# Patient Record
Sex: FEMALE | Race: WHITE | NOT HISPANIC OR LATINO | Employment: FULL TIME | URBAN - METROPOLITAN AREA
[De-identification: names, ages, dates, MRNs, and addresses within clinical notes are randomized per-mention and may not be internally consistent; named-entity substitution may affect disease eponyms.]

---

## 2022-03-01 ENCOUNTER — HOSPITAL ENCOUNTER (EMERGENCY)
Facility: MEDICAL CENTER | Age: 37
End: 2022-03-01
Attending: EMERGENCY MEDICINE
Payer: COMMERCIAL

## 2022-03-01 VITALS
SYSTOLIC BLOOD PRESSURE: 137 MMHG | RESPIRATION RATE: 20 BRPM | TEMPERATURE: 98.7 F | HEART RATE: 96 BPM | WEIGHT: 145.5 LBS | OXYGEN SATURATION: 96 % | DIASTOLIC BLOOD PRESSURE: 107 MMHG

## 2022-03-01 DIAGNOSIS — J02.9 EXUDATIVE PHARYNGITIS: ICD-10-CM

## 2022-03-01 LAB — S PYO DNA SPEC NAA+PROBE: NOT DETECTED

## 2022-03-01 PROCEDURE — 700102 HCHG RX REV CODE 250 W/ 637 OVERRIDE(OP): Performed by: EMERGENCY MEDICINE

## 2022-03-01 PROCEDURE — 99283 EMERGENCY DEPT VISIT LOW MDM: CPT

## 2022-03-01 PROCEDURE — A9270 NON-COVERED ITEM OR SERVICE: HCPCS | Performed by: EMERGENCY MEDICINE

## 2022-03-01 PROCEDURE — 87651 STREP A DNA AMP PROBE: CPT

## 2022-03-01 RX ORDER — IBUPROFEN 800 MG/1
800 TABLET ORAL EVERY 8 HOURS PRN
Qty: 30 TABLET | Refills: 0 | Status: SHIPPED | OUTPATIENT
Start: 2022-03-01

## 2022-03-01 RX ORDER — ACETAMINOPHEN 325 MG/1
650 TABLET ORAL ONCE
Status: COMPLETED | OUTPATIENT
Start: 2022-03-01 | End: 2022-03-01

## 2022-03-01 RX ORDER — AMOXICILLIN 500 MG/1
500 CAPSULE ORAL 3 TIMES DAILY
Qty: 21 CAPSULE | Refills: 0 | Status: SHIPPED | OUTPATIENT
Start: 2022-03-01 | End: 2022-03-08

## 2022-03-01 RX ADMIN — ACETAMINOPHEN 650 MG: 325 TABLET, FILM COATED ORAL at 13:23

## 2022-03-01 ASSESSMENT — LIFESTYLE VARIABLES: DO YOU DRINK ALCOHOL: NO

## 2022-03-01 NOTE — ED TRIAGE NOTES
Pt ambulatory to triage c/o sore throat cough headache x 1 week. Pt states she took covid test which came back negative. Pt states she also had fever 2 days ago. Pt states she thinks could have strep throat as this feels similar to when she had it in past. nad

## 2022-03-01 NOTE — ED PROVIDER NOTES
ED Provider Note    CHIEF COMPLAINT  Chief Complaint   Patient presents with   • Sore Throat   • Cough   • Fever       HPI  Jessika Kidd is a 36 y.o. female who presents for evaluation of persistent sore throat mild pain with swallowing low-grade fever intermittent cough.  The patient has apparently tested twice for Covid over the last 3 days.  Both of these test were negative.  She is worried about possible strep throat.  She is otherwise healthy with no significant medical or surgical history.  No other complaints reported    REVIEW OF SYSTEMS  See HPI for further details.  Positive for low-grade fever cough all other systems are negative.     PAST MEDICAL HISTORY  No past medical history on file.  No stated medical history  FAMILY HISTORY  Noncontributory    SOCIAL HISTORY  Social History     Socioeconomic History   • Marital status: Single   Tobacco Use   • Smoking status: Current Every Day Smoker     Types: Cigarettes   • Smokeless tobacco: Never Used   Substance and Sexual Activity   • Alcohol use: Yes   • Drug use: Yes     Types: Inhaled       SURGICAL HISTORY  No past surgical history on file.    CURRENT MEDICATIONS  Home Medications     Reviewed by Mahogany Reed R.N. (Registered Nurse) on 03/01/22 at 1153  Med List Status: Complete   Medication Last Dose Status        Patient Iker Taking any Medications                       ALLERGIES  No Known Allergies    PHYSICAL EXAM  VITAL SIGNS: BP (!) 163/105   Pulse (!) 107   Temp 36.9 °C (98.5 °F) (Tympanic)   Resp 16   Wt 66 kg (145 lb 8.1 oz)   LMP 02/08/2022   SpO2 97%       Constitutional: Well developed, Well nourished, No acute distress, Non-toxic appearance.   HENT: Normocephalic, Atraumatic, Bilateral external ears normal, Oropharynx moist, No oral exudates, Nose normal.  Posterior pharynx is slightly erythematous no exudates or abscess noted  Eyes: PERRLA, EOMI, Conjunctiva normal, No discharge.   Neck: Normal range of motion, No  tenderness, Supple, No stridor.   Lymphatic: No lymphadenopathy noted.   Cardiovascular: Mild tachycardia, Normal rhythm, No murmurs, No rubs, No gallops.   Thorax & Lungs: Normal breath sounds, No respiratory distress, No wheezing, No chest tenderness.   Abdomen: Bowel sounds normal, Soft, No tenderness, No masses, No pulsatile masses.   Skin: Warm, Dry, No erythema, No rash.   Extremities: Intact distal pulses, No edema, No tenderness, No cyanosis, No clubbing.   Neurologic: Alert & oriented x 3, Normal motor function, Normal sensory function, No focal deficits noted.   Psychiatric: Anxious      COURSE & MEDICAL DECISION MAKING  Pertinent Labs & Imaging studies reviewed. (See chart for details)  Results for orders placed or performed during the hospital encounter of 03/01/22   Group A Strep by PCR    Specimen: Throat   Result Value Ref Range    Group A Strep by PCR Not Detected Not Detected      Patient presents here with ongoing sore throat.  She has been tested for Covid twice already this week both which were negative.  Rapid strep is negative.  With exudative pharyngitis I will treat her with antibiotics as it could be group B strep or other atypical bacterial throat infection.  We will start her on amoxicillin    FINAL IMPRESSION  1.  Exudative pharyngitis         Electronically signed by: Kavon Lester M.D., 3/1/2022 1:32 PM

## 2022-03-01 NOTE — ED NOTES
dchome with written and verbal education on exudative throat infection. DC instructions discussed. 2 prescriptions sent to pharmacy pharmacy

## 2022-03-01 NOTE — ED NOTES
Pt resting in chair. Declines additional water at this time  Spoke to Microbiology tech, strep results will be available in 1-2 hrs from start of test.

## 2022-03-01 NOTE — DISCHARGE INSTRUCTIONS
The patient has tested negative for strep throat.  She also tested negative for Covid twice this week.  Once she is symptom-free for 24 hours she is cleared for work

## 2022-08-29 ENCOUNTER — TELEPHONE (OUTPATIENT)
Dept: SCHEDULING | Facility: IMAGING CENTER | Age: 37
End: 2022-08-29

## 2022-08-31 ENCOUNTER — APPOINTMENT (OUTPATIENT)
Dept: MEDICAL GROUP | Facility: MEDICAL CENTER | Age: 37
End: 2022-08-31

## 2022-08-31 PROBLEM — K86.3 PANCREATIC PSEUDOCYST: Status: ACTIVE | Noted: 2022-08-23

## 2022-08-31 PROBLEM — F10.230 ALCOHOL DEPENDENCE WITH UNCOMPLICATED WITHDRAWAL (HCC): Status: ACTIVE | Noted: 2022-08-23

## 2022-08-31 RX ORDER — OXYCODONE HYDROCHLORIDE 5 MG/1
5 CAPSULE ORAL EVERY 6 HOURS PRN
COMMUNITY
Start: 2022-08-28 | End: 2022-08-31

## 2022-08-31 RX ORDER — MULTIVITAMIN,THERAPEUTIC
1 TABLET ORAL DAILY
COMMUNITY
Start: 2022-08-28 | End: 2022-09-27

## 2022-08-31 RX ORDER — AMOXICILLIN AND CLAVULANATE POTASSIUM 875; 125 MG/1; MG/1
1 TABLET, FILM COATED ORAL
COMMUNITY
Start: 2022-08-28 | End: 2022-09-02

## 2022-08-31 RX ORDER — GAUZE BANDAGE 2" X 2"
100 BANDAGE TOPICAL DAILY
COMMUNITY
Start: 2022-08-28 | End: 2022-09-27

## 2022-08-31 RX ORDER — FOLIC ACID 1 MG/1
1 TABLET ORAL DAILY
COMMUNITY
Start: 2022-08-28 | End: 2022-09-27

## 2022-08-31 RX ORDER — MAGNESIUM OXIDE 400 MG/1
400 TABLET ORAL DAILY
COMMUNITY
Start: 2022-08-28 | End: 2022-09-11

## 2022-08-31 RX ORDER — PANTOPRAZOLE SODIUM 40 MG/1
40 TABLET, DELAYED RELEASE ORAL
COMMUNITY
Start: 2022-08-28 | End: 2022-09-27

## 2022-08-31 ASSESSMENT — ENCOUNTER SYMPTOMS
SHORTNESS OF BREATH: 0
CHILLS: 0
ORTHOPNEA: 0
FEVER: 0
COUGH: 0
PALPITATIONS: 0

## 2022-08-31 NOTE — PROGRESS NOTES
Subjective:     CC: No chief complaint on file.      HISTORY OF THE PRESENT ILLNESS: Patient is a 37 y.o. female here to establish care for ***  Previous PCP  Allergies: No Known Allergies  Medications:   Current Outpatient Medications:     ibuprofen (MOTRIN) 800 MG Tab, Take 1 Tablet by mouth every 8 hours as needed., Disp: 30 Tablet, Rfl: 0    ROS:   Review of Systems   Constitutional:  Negative for chills and fever.   Respiratory:  Negative for cough and shortness of breath.    Cardiovascular:  Negative for chest pain, palpitations, orthopnea and leg swelling.  ***      Objective:     Exam:   There were no vitals taken for this visit. There is no height or weight on file to calculate BMI.    Physical Exam  Constitutional:       Appearance: Normal appearance.   Eyes:      Pupils: Pupils are equal, round, and reactive to light.   Cardiovascular:      Rate and Rhythm: Normal rate and regular rhythm.      Pulses: Normal pulses.      Heart sounds: Normal heart sounds.   Pulmonary:      Effort: Pulmonary effort is normal.      Breath sounds: Normal breath sounds.   Abdominal:      General: Bowel sounds are normal.      Palpations: Abdomen is soft.   Neurological:      Mental Status: She is alert and oriented to person, place, and time.   Psychiatric:         Mood and Affect: Mood normal.         Behavior: Behavior normal.       A chaperone was offered to the patient during today's exam. {CHAPERONE:85052}    Labs: ***    Assessment & Plan:   37 y.o. female with the following -    There are no diagnoses linked to this encounter.    Referral for genetic research was offered. Patient {declined/accepted}.    No follow-ups on file.    Please note that this dictation was created using voice recognition software. I have made every reasonable attempt to correct obvious errors, but I expect that there are errors of grammar and possibly content that I did not discover before finalizing the note.

## 2023-02-06 ENCOUNTER — OFFICE VISIT (OUTPATIENT)
Dept: URGENT CARE | Facility: CLINIC | Age: 38
End: 2023-02-06
Payer: MEDICAID

## 2023-02-06 VITALS
BODY MASS INDEX: 24.55 KG/M2 | WEIGHT: 130 LBS | HEIGHT: 61 IN | RESPIRATION RATE: 15 BRPM | SYSTOLIC BLOOD PRESSURE: 116 MMHG | OXYGEN SATURATION: 98 % | HEART RATE: 76 BPM | DIASTOLIC BLOOD PRESSURE: 70 MMHG | TEMPERATURE: 97.5 F

## 2023-02-06 DIAGNOSIS — R06.02 SHORTNESS OF BREATH: ICD-10-CM

## 2023-02-06 DIAGNOSIS — J32.9 BACTERIAL SINUSITIS: ICD-10-CM

## 2023-02-06 DIAGNOSIS — J45.40 MODERATE PERSISTENT ASTHMA, UNSPECIFIED WHETHER COMPLICATED: ICD-10-CM

## 2023-02-06 DIAGNOSIS — B96.89 BACTERIAL SINUSITIS: ICD-10-CM

## 2023-02-06 DIAGNOSIS — J00 ACUTE RHINITIS: ICD-10-CM

## 2023-02-06 PROCEDURE — 99214 OFFICE O/P EST MOD 30 MIN: CPT | Performed by: PHYSICIAN ASSISTANT

## 2023-02-06 RX ORDER — PANTOPRAZOLE SODIUM 40 MG/1
TABLET, DELAYED RELEASE ORAL
COMMUNITY
Start: 2023-02-02

## 2023-02-06 RX ORDER — TRAZODONE HYDROCHLORIDE 100 MG/1
TABLET ORAL
COMMUNITY
Start: 2023-01-26

## 2023-02-06 RX ORDER — EMPAGLIFLOZIN 10 MG/1
TABLET, FILM COATED ORAL
COMMUNITY
Start: 2023-01-20

## 2023-02-06 RX ORDER — PREDNISONE 10 MG/1
40 TABLET ORAL DAILY
Qty: 20 TABLET | Refills: 0 | Status: SHIPPED | OUTPATIENT
Start: 2023-02-06 | End: 2023-02-11

## 2023-02-06 RX ORDER — PANTOPRAZOLE SODIUM 40 MG/1
40 TABLET, DELAYED RELEASE ORAL
COMMUNITY
Start: 2022-12-27 | End: 2023-06-25

## 2023-02-06 RX ORDER — AMOXICILLIN AND CLAVULANATE POTASSIUM 875; 125 MG/1; MG/1
1 TABLET, FILM COATED ORAL 2 TIMES DAILY
Qty: 14 TABLET | Refills: 0 | Status: SHIPPED | OUTPATIENT
Start: 2023-02-06 | End: 2023-02-13

## 2023-02-06 RX ORDER — PANCRELIPASE 30000; 6000; 19000 [USP'U]/1; [USP'U]/1; [USP'U]/1
CAPSULE, DELAYED RELEASE PELLETS ORAL
COMMUNITY
Start: 2023-01-26

## 2023-02-06 RX ORDER — ALBUTEROL SULFATE 90 UG/1
2 AEROSOL, METERED RESPIRATORY (INHALATION) EVERY 6 HOURS PRN
Qty: 8.5 G | Refills: 0 | Status: SHIPPED | OUTPATIENT
Start: 2023-02-06

## 2023-02-06 RX ORDER — OXYCODONE HYDROCHLORIDE 15 MG/1
TABLET ORAL
COMMUNITY
Start: 2023-01-26

## 2023-02-06 ASSESSMENT — ENCOUNTER SYMPTOMS
FEVER: 0
EYE PAIN: 0
SHORTNESS OF BREATH: 1
CHILLS: 1
VOMITING: 0
COUGH: 1
CONSTIPATION: 0
HEADACHES: 1
SPUTUM PRODUCTION: 1
DIARRHEA: 0
SORE THROAT: 0
MYALGIAS: 0
SINUS PAIN: 1
NAUSEA: 0
ABDOMINAL PAIN: 0

## 2023-02-06 ASSESSMENT — FIBROSIS 4 INDEX: FIB4 SCORE: 0.31

## 2023-02-07 NOTE — PROGRESS NOTES
"Subjective:   Jessika Kidd is a 37 y.o. female who presents for Cough, Runny Nose (On day eight ), and Sinusitis      37-year-old female notes that she has had cough and congestion, asthma worsening symptoms, worsening sinus pressure pain and headache over the last week.  The sinus pain and pressure seems to get getting worse.  She has a history of asthma, does not currently have an albuterol inhaler.  History of recurrent pancreatitis, she is concerned about taking OTC medications as she is not sure what may exacerbate her diabetes and/or pancreatitis and/or other chronic health conditions.  She feels short of breath but does not have any severe chest pain.  She is not aware of any sick contacts.    Review of Systems   Constitutional:  Positive for chills and malaise/fatigue. Negative for fever.   HENT:  Positive for congestion and sinus pain. Negative for ear pain and sore throat.    Eyes:  Negative for pain.   Respiratory:  Positive for cough, sputum production and shortness of breath.    Cardiovascular:  Negative for chest pain.   Gastrointestinal:  Negative for abdominal pain, constipation, diarrhea, nausea and vomiting.   Genitourinary:  Negative for dysuria.   Musculoskeletal:  Negative for myalgias.   Skin:  Negative for rash.   Neurological:  Positive for headaches.   All other systems reviewed and are negative.    Medications, Allergies, and current problem list reviewed today in Epic.     Objective:     /70 (BP Location: Right arm, Patient Position: Sitting, BP Cuff Size: Adult long)   Pulse 76   Temp 36.4 °C (97.5 °F) (Temporal)   Resp 15   Ht 1.549 m (5' 1\")   Wt 59 kg (130 lb)   SpO2 98%     Physical Exam  Vitals reviewed.   Constitutional:       Appearance: Normal appearance.   HENT:      Head: Normocephalic and atraumatic.      Right Ear: Tympanic membrane, ear canal and external ear normal.      Left Ear: Tympanic membrane, ear canal and external ear normal.      Nose: Congestion " and rhinorrhea present.      Comments: Maxillary sinus TTP maxillary sinus TTP     Mouth/Throat:      Mouth: Mucous membranes are moist.      Pharynx: No oropharyngeal exudate or posterior oropharyngeal erythema.      Comments: POST NASAL DRIP  Eyes:      Conjunctiva/sclera: Conjunctivae normal.   Cardiovascular:      Rate and Rhythm: Normal rate and regular rhythm.   Pulmonary:      Effort: Pulmonary effort is normal.      Breath sounds: Wheezing present.   Musculoskeletal:      Cervical back: Normal range of motion.   Lymphadenopathy:      Cervical: No cervical adenopathy.   Skin:     General: Skin is warm and dry.      Capillary Refill: Capillary refill takes less than 2 seconds.   Neurological:      Mental Status: She is alert and oriented to person, place, and time.       Assessment/Plan:     Diagnosis and associated orders:     1. Moderate persistent asthma, unspecified whether complicated  predniSONE (DELTASONE) 10 MG Tab    albuterol 108 (90 Base) MCG/ACT Aero Soln inhalation aerosol      2. Bacterial sinusitis  amoxicillin-clavulanate (AUGMENTIN) 875-125 MG Tab      3. Shortness of breath        4. Acute rhinitis           Comments/MDM:     Expiratory wheezes but adequate SPO2.  Worsening symptoms consistent with sinus pain and pressure.  History of immune compromise.  Recommend antihistamine nasal steroid.  Anticipate slow improvement over the coming 48 to 72 hours.  She felt she was worsening and recommend repeat evaluation.  No signs of pulmonary consolidation but she does have some pretty severe asthma exacerbation.  Offered nebulizer treatment in clinic which she declined as she had an appointment she needed to attend.  Reviewed ER precautions.  Patient declined work note.  Given duration of symptoms some futility in PCR testing.         Differential diagnosis, natural history, supportive care, and indications for immediate follow-up discussed.    Advised the patient to follow-up with the primary care  physician for recheck, reevaluation, and consideration of further management.    Please note that this dictation was created using voice recognition software. I have made a reasonable attempt to correct obvious errors, but I expect that there are errors of grammar and possibly content that I did not discover before finalizing the note.    This note was electronically signed by Milan Smalls PA-C

## 2023-02-28 DIAGNOSIS — J45.40 MODERATE PERSISTENT ASTHMA, UNSPECIFIED WHETHER COMPLICATED: ICD-10-CM

## 2023-05-29 RX ORDER — ALBUTEROL SULFATE 90 UG/1
AEROSOL, METERED RESPIRATORY (INHALATION)
Qty: 8.5 G | Refills: 0 | OUTPATIENT
Start: 2023-05-29